# Patient Record
Sex: FEMALE | Race: BLACK OR AFRICAN AMERICAN | NOT HISPANIC OR LATINO | Employment: FULL TIME | URBAN - METROPOLITAN AREA
[De-identification: names, ages, dates, MRNs, and addresses within clinical notes are randomized per-mention and may not be internally consistent; named-entity substitution may affect disease eponyms.]

---

## 2024-11-15 ENCOUNTER — APPOINTMENT (OUTPATIENT)
Dept: RADIOLOGY | Facility: HOSPITAL | Age: 34
End: 2024-11-15
Payer: COMMERCIAL

## 2024-11-15 ENCOUNTER — APPOINTMENT (OUTPATIENT)
Dept: CARDIOLOGY | Facility: HOSPITAL | Age: 34
End: 2024-11-15
Payer: COMMERCIAL

## 2024-11-15 ENCOUNTER — HOSPITAL ENCOUNTER (EMERGENCY)
Facility: HOSPITAL | Age: 34
Discharge: HOME | End: 2024-11-16
Attending: EMERGENCY MEDICINE
Payer: COMMERCIAL

## 2024-11-15 VITALS
BODY MASS INDEX: 32.49 KG/M2 | HEIGHT: 65 IN | WEIGHT: 195 LBS | DIASTOLIC BLOOD PRESSURE: 80 MMHG | HEART RATE: 76 BPM | RESPIRATION RATE: 17 BRPM | SYSTOLIC BLOOD PRESSURE: 142 MMHG | TEMPERATURE: 98.4 F | OXYGEN SATURATION: 100 %

## 2024-11-15 DIAGNOSIS — R55 NEAR SYNCOPE: Primary | ICD-10-CM

## 2024-11-15 LAB
ALBUMIN SERPL BCP-MCNC: 3.9 G/DL (ref 3.4–5)
ALP SERPL-CCNC: 57 U/L (ref 33–110)
ALT SERPL W P-5'-P-CCNC: 22 U/L (ref 7–45)
ANION GAP SERPL CALC-SCNC: 11 MMOL/L (ref 10–20)
AST SERPL W P-5'-P-CCNC: 41 U/L (ref 9–39)
BASOPHILS # BLD AUTO: 0.06 X10*3/UL (ref 0–0.1)
BASOPHILS NFR BLD AUTO: 1 %
BILIRUB SERPL-MCNC: 0.3 MG/DL (ref 0–1.2)
BUN SERPL-MCNC: 12 MG/DL (ref 6–23)
CALCIUM SERPL-MCNC: 9.3 MG/DL (ref 8.6–10.3)
CARDIAC TROPONIN I PNL SERPL HS: 10 NG/L (ref 0–13)
CHLORIDE SERPL-SCNC: 108 MMOL/L (ref 98–107)
CO2 SERPL-SCNC: 24 MMOL/L (ref 21–32)
CREAT SERPL-MCNC: 0.85 MG/DL (ref 0.5–1.05)
EGFRCR SERPLBLD CKD-EPI 2021: >90 ML/MIN/1.73M*2
EOSINOPHIL # BLD AUTO: 0.2 X10*3/UL (ref 0–0.7)
EOSINOPHIL NFR BLD AUTO: 3.2 %
ERYTHROCYTE [DISTWIDTH] IN BLOOD BY AUTOMATED COUNT: 15.2 % (ref 11.5–14.5)
GLUCOSE SERPL-MCNC: 70 MG/DL (ref 74–99)
HCG UR QL IA.RAPID: NEGATIVE
HCT VFR BLD AUTO: 36.8 % (ref 36–46)
HGB BLD-MCNC: 12 G/DL (ref 12–16)
IMM GRANULOCYTES # BLD AUTO: 0.01 X10*3/UL (ref 0–0.7)
IMM GRANULOCYTES NFR BLD AUTO: 0.2 % (ref 0–0.9)
LYMPHOCYTES # BLD AUTO: 2.46 X10*3/UL (ref 1.2–4.8)
LYMPHOCYTES NFR BLD AUTO: 39.5 %
MCH RBC QN AUTO: 27.3 PG (ref 26–34)
MCHC RBC AUTO-ENTMCNC: 32.6 G/DL (ref 32–36)
MCV RBC AUTO: 84 FL (ref 80–100)
MONOCYTES # BLD AUTO: 0.49 X10*3/UL (ref 0.1–1)
MONOCYTES NFR BLD AUTO: 7.9 %
NEUTROPHILS # BLD AUTO: 3.01 X10*3/UL (ref 1.2–7.7)
NEUTROPHILS NFR BLD AUTO: 48.2 %
NRBC BLD-RTO: 0 /100 WBCS (ref 0–0)
PLATELET # BLD AUTO: 283 X10*3/UL (ref 150–450)
POTASSIUM SERPL-SCNC: 4.2 MMOL/L (ref 3.5–5.3)
PROT SERPL-MCNC: 6.7 G/DL (ref 6.4–8.2)
RBC # BLD AUTO: 4.39 X10*6/UL (ref 4–5.2)
SODIUM SERPL-SCNC: 139 MMOL/L (ref 136–145)
WBC # BLD AUTO: 6.2 X10*3/UL (ref 4.4–11.3)

## 2024-11-15 PROCEDURE — 81025 URINE PREGNANCY TEST: CPT | Performed by: NURSE PRACTITIONER

## 2024-11-15 PROCEDURE — 36415 COLL VENOUS BLD VENIPUNCTURE: CPT | Performed by: NURSE PRACTITIONER

## 2024-11-15 PROCEDURE — 99285 EMERGENCY DEPT VISIT HI MDM: CPT | Mod: 25

## 2024-11-15 PROCEDURE — 71046 X-RAY EXAM CHEST 2 VIEWS: CPT | Mod: FOREIGN READ | Performed by: RADIOLOGY

## 2024-11-15 PROCEDURE — 72125 CT NECK SPINE W/O DYE: CPT | Performed by: RADIOLOGY

## 2024-11-15 PROCEDURE — 70450 CT HEAD/BRAIN W/O DYE: CPT | Performed by: RADIOLOGY

## 2024-11-15 PROCEDURE — 80053 COMPREHEN METABOLIC PANEL: CPT | Performed by: NURSE PRACTITIONER

## 2024-11-15 PROCEDURE — 72125 CT NECK SPINE W/O DYE: CPT

## 2024-11-15 PROCEDURE — 93005 ELECTROCARDIOGRAM TRACING: CPT

## 2024-11-15 PROCEDURE — 70450 CT HEAD/BRAIN W/O DYE: CPT

## 2024-11-15 PROCEDURE — 85025 COMPLETE CBC W/AUTO DIFF WBC: CPT | Performed by: NURSE PRACTITIONER

## 2024-11-15 PROCEDURE — 84484 ASSAY OF TROPONIN QUANT: CPT | Performed by: NURSE PRACTITIONER

## 2024-11-15 PROCEDURE — 71046 X-RAY EXAM CHEST 2 VIEWS: CPT

## 2024-11-15 ASSESSMENT — PAIN SCALES - GENERAL: PAINLEVEL_OUTOF10: 6

## 2024-11-15 ASSESSMENT — COLUMBIA-SUICIDE SEVERITY RATING SCALE - C-SSRS
6. HAVE YOU EVER DONE ANYTHING, STARTED TO DO ANYTHING, OR PREPARED TO DO ANYTHING TO END YOUR LIFE?: NO
2. HAVE YOU ACTUALLY HAD ANY THOUGHTS OF KILLING YOURSELF?: NO
1. IN THE PAST MONTH, HAVE YOU WISHED YOU WERE DEAD OR WISHED YOU COULD GO TO SLEEP AND NOT WAKE UP?: NO

## 2024-11-15 ASSESSMENT — PAIN DESCRIPTION - PAIN TYPE: TYPE: ACUTE PAIN

## 2024-11-15 ASSESSMENT — PAIN DESCRIPTION - LOCATION: LOCATION: HEAD

## 2024-11-15 ASSESSMENT — PAIN - FUNCTIONAL ASSESSMENT: PAIN_FUNCTIONAL_ASSESSMENT: 0-10

## 2024-11-16 NOTE — ED PROVIDER NOTES
Chief Complaint   Patient presents with   • Fall   • Head Injury   • Syncope       HPI       34 year old female presents to the Emergency Department today complaining of having a syncopal episode this evening. Reports that she was yelling at her kids to go to bed when she passed out and hit the right side of her head on the ground. Denies any associated fever, chills, headache, neck pain, chest pain, shortness of breath, abdominal pain, nausea, vomiting, diarrhea, constipation, or urinary symptoms. No prior history of syncope. Did not feel that her heart was beating fast, slow, or irregular. No recent periods of immobilization, long travel, recent surgeries, history of cancer, lower extremity edema/pain, or prior history of DVT/PE.       History provided by:  Patient             Patient History   History reviewed. No pertinent past medical history.  History reviewed. No pertinent surgical history.  No family history on file.  Social History     Tobacco Use   • Smoking status: Not on file   • Smokeless tobacco: Not on file   Substance Use Topics   • Alcohol use: Not on file   • Drug use: Not on file           Physical Exam  Constitutional:       Appearance: Normal appearance.   HENT:      Head: Normocephalic.      Right Ear: Tympanic membrane, ear canal and external ear normal.      Left Ear: Tympanic membrane, ear canal and external ear normal.      Ears:      Comments: Bilateral auditory canals are non-inflamed and non-reddened. Bilateral TMs are pearly gray with good light reflex. No hemotympanum or hernández signs noted.      Nose: Nose normal.      Comments: Septum midline without deviation. Turbinates are not inflamed and reddened. No septal hematoma noted.      Mouth/Throat:      Comments:  Mucous membranes are moist. All teeth are intact. Uvula midline without deviation rises upon phonation. Tonsils 1+ without exudate.   Eyes:      Comments: Bilateral conjunctiva are without injection, discharge, or drainage.  PERRL with consensual pupil response bilaterally. EOM's are intact without any signs of entrapment. No hyphema or raccoon's eyes.   Cardiovascular:      Rate and Rhythm: Normal rate and regular rhythm.      Pulses:           Radial pulses are 3+ on the right side and 3+ on the left side.      Heart sounds: Normal heart sounds. No murmur heard.     No friction rub. No gallop.   Pulmonary:      Effort: Pulmonary effort is normal.      Breath sounds: Normal breath sounds. No wheezing, rhonchi or rales.   Abdominal:      General: Abdomen is flat. Bowel sounds are normal.      Palpations: Abdomen is soft.      Tenderness: There is no right CVA tenderness, left CVA tenderness, guarding or rebound. Negative signs include Browning's sign and McBurney's sign.   Musculoskeletal:      Cervical back: Full passive range of motion without pain, normal range of motion and neck supple.      Comments: No edema, cyanosis, or clubbing noted.   Lymphadenopathy:      Cervical: No cervical adenopathy.   Skin:     Comments: No rashes, lesions, petechiae, or purpura. No signs of infection.   Neurological:      Mental Status: She is alert and oriented to person, place, and time.      Comments: Alert and oriented to person, place, or time. Follows commands well. Hand grasps and push/pulls of upper and lower extremities are equally strong bilaterally. Gait is steady and strong.    Psychiatric:         Attention and Perception: Attention normal.         Mood and Affect: Mood normal.         Speech: Speech normal.         Labs Reviewed   CBC WITH AUTO DIFFERENTIAL - Abnormal       Result Value    WBC 6.2      nRBC 0.0      RBC 4.39      Hemoglobin 12.0      Hematocrit 36.8      MCV 84      MCH 27.3      MCHC 32.6      RDW 15.2 (*)     Platelets 283      Neutrophils % 48.2      Immature Granulocytes %, Automated 0.2      Lymphocytes % 39.5      Monocytes % 7.9      Eosinophils % 3.2      Basophils % 1.0      Neutrophils Absolute 3.01      Immature  Granulocytes Absolute, Automated 0.01      Lymphocytes Absolute 2.46      Monocytes Absolute 0.49      Eosinophils Absolute 0.20      Basophils Absolute 0.06     COMPREHENSIVE METABOLIC PANEL - Abnormal    Glucose 70 (*)     Sodium 139      Potassium 4.2      Chloride 108 (*)     Bicarbonate 24      Anion Gap 11      Urea Nitrogen 12      Creatinine 0.85      eGFR >90      Calcium 9.3      Albumin 3.9      Alkaline Phosphatase 57      Total Protein 6.7      AST 41 (*)     Bilirubin, Total 0.3      ALT 22     TROPONIN I, HIGH SENSITIVITY - Normal    Troponin I, High Sensitivity 10      Narrative:     Less than 99th percentile of normal range cutoff-  Female and children under 18 years old <14 ng/L; Male <21 ng/L: Negative  Repeat testing should be performed if clinically indicated.     Female and children under 18 years old 14-50 ng/L; Male 21-50 ng/L:  Consistent with possible cardiac damage and possible increased clinical   risk. Serial measurements may help to assess extent of myocardial damage.     >50 ng/L: Consistent with cardiac damage, increased clinical risk and  myocardial infarction. Serial measurements may help assess extent of   myocardial damage.      NOTE: Children less than 1 year old may have higher baseline troponin   levels and results should be interpreted in conjunction with the overall   clinical context.     NOTE: Troponin I testing is performed using a different   testing methodology at [facility] than at other   [facility]. Direct result comparisons should only   be made within the same method.   HCG, URINE, QUALITATIVE - Normal    HCG, Urine NEGATIVE         XR chest 2 views   Final Result   No acute cardiopulmonary disease.   Signed by Mekhi Bronson      CT head wo IV contrast   Final Result   No evidence of acute intracranial hemorrhage or depressed calvarial   fracture.                  MACRO:   None        Signed by: Malick Herrera 11/15/2024 11:25 PM   Dictation  workstation:   [id number]      CT cervical spine wo IV contrast   Final Result   No evidence of acute fracture or subluxation of the cervical spine.        Straightening of the normal cervical lordosis which may be secondary   to patient positioning or muscle spasm.        MACRO:   None        Signed by: Malick Herrera 11/15/2024 11:26 PM   Dictation workstation:   [id number]               ED Course & MDM   Diagnoses as of 11/16/24 0101   Near syncope           Medical Decision Making  EKG interpreted by Dr. Guerrero. Indication: syncope. Findings: NSR with a ventricular rate of 62, normal axis, normal intervals, and no acute ischemic or injury pattern. Impression: No acute pathology.       Patient was seen and evaluated by Dr. Guerrero. Saline lock was established with labs drawn and results as above. Blood counts, electrolytes, liver function, and kidney function were unremarkable. EKG showed no acute pathology and she had a normal troponin. Therefore, we do not feel that her syncope was related to a cardiac source. Urine pregnancy was negative. CXR shows no acute pathology. CT scans of her head and cervical spine show no evidence of acute intracranial hemorrhage or depressed calvarial fracture, no evidence of acute fracture or subluxation of the cervical spine, and straightening of the normal cervical lordosis which may be secondary to patient positioning or muscle spasm. We are unclear as to the direct etiology of her syncope. Follow up with their doctor in 3 days. Return if worse in any way. Discharged in stable condition with computer instructions.    Diagnostic Impression:     1. Acute syncope               Your medication list      You have not been prescribed any medications.           Procedure  Procedures     Anson Saunders, MODESTO-CNP  11/16/24 0101

## 2024-11-16 NOTE — ED TRIAGE NOTES
Pt presents to the ED for a fall at home. Pt States she stood up, fainted and then hit her head. Pt states this has never happened before. PT states she remembers telling family to got to bed and then woke up on the floor. PT is a and o x4 to baseline. Just complaining of head/eye pain. No thinners.

## 2024-11-18 LAB
ATRIAL RATE: 84 BPM
P AXIS: 66 DEGREES
PR INTERVAL: 152 MS
Q ONSET: 251 MS
QRS COUNT: 13 BEATS
QRS DURATION: 83 MS
QT INTERVAL: 375 MS
QTC CALCULATION(BAZETT): 438 MS
QTC FREDERICIA: 416 MS
R AXIS: 57 DEGREES
T AXIS: 49 DEGREES
T OFFSET: 439 MS
VENTRICULAR RATE: 82 BPM

## 2024-12-15 ENCOUNTER — HOSPITAL ENCOUNTER (EMERGENCY)
Facility: HOSPITAL | Age: 34
Discharge: HOME | End: 2024-12-15
Attending: EMERGENCY MEDICINE
Payer: COMMERCIAL

## 2024-12-15 ENCOUNTER — APPOINTMENT (OUTPATIENT)
Dept: RADIOLOGY | Facility: HOSPITAL | Age: 34
End: 2024-12-15
Payer: COMMERCIAL

## 2024-12-15 ENCOUNTER — PHARMACY VISIT (OUTPATIENT)
Dept: PHARMACY | Facility: CLINIC | Age: 34
End: 2024-12-15
Payer: MEDICAID

## 2024-12-15 VITALS
HEIGHT: 65 IN | TEMPERATURE: 98.4 F | SYSTOLIC BLOOD PRESSURE: 155 MMHG | WEIGHT: 195 LBS | RESPIRATION RATE: 18 BRPM | DIASTOLIC BLOOD PRESSURE: 101 MMHG | BODY MASS INDEX: 32.49 KG/M2 | OXYGEN SATURATION: 100 % | HEART RATE: 83 BPM

## 2024-12-15 DIAGNOSIS — S29.012A UPPER BACK STRAIN, INITIAL ENCOUNTER: Primary | ICD-10-CM

## 2024-12-15 LAB — HCG UR QL IA.RAPID: NEGATIVE

## 2024-12-15 PROCEDURE — 81025 URINE PREGNANCY TEST: CPT | Performed by: EMERGENCY MEDICINE

## 2024-12-15 PROCEDURE — 72072 X-RAY EXAM THORAC SPINE 3VWS: CPT | Performed by: RADIOLOGY

## 2024-12-15 PROCEDURE — 71045 X-RAY EXAM CHEST 1 VIEW: CPT

## 2024-12-15 PROCEDURE — 99284 EMERGENCY DEPT VISIT MOD MDM: CPT | Performed by: EMERGENCY MEDICINE

## 2024-12-15 PROCEDURE — 72072 X-RAY EXAM THORAC SPINE 3VWS: CPT

## 2024-12-15 PROCEDURE — 71045 X-RAY EXAM CHEST 1 VIEW: CPT | Performed by: RADIOLOGY

## 2024-12-15 PROCEDURE — RXMED WILLOW AMBULATORY MEDICATION CHARGE

## 2024-12-15 RX ORDER — METAXALONE 800 MG/1
800 TABLET ORAL 3 TIMES DAILY
Qty: 30 TABLET | Refills: 0 | Status: SHIPPED | OUTPATIENT
Start: 2024-12-15 | End: 2024-12-25

## 2024-12-15 RX ORDER — METHYLPREDNISOLONE 4 MG/1
TABLET ORAL
Qty: 21 TABLET | Refills: 0 | Status: SHIPPED | OUTPATIENT
Start: 2024-12-15 | End: 2024-12-21

## 2024-12-15 RX ORDER — NAPROXEN 500 MG/1
500 TABLET ORAL
Qty: 30 TABLET | Refills: 0 | Status: SHIPPED | OUTPATIENT
Start: 2024-12-15 | End: 2024-12-30

## 2024-12-15 ASSESSMENT — PAIN - FUNCTIONAL ASSESSMENT: PAIN_FUNCTIONAL_ASSESSMENT: 0-10

## 2024-12-15 ASSESSMENT — COLUMBIA-SUICIDE SEVERITY RATING SCALE - C-SSRS
1. IN THE PAST MONTH, HAVE YOU WISHED YOU WERE DEAD OR WISHED YOU COULD GO TO SLEEP AND NOT WAKE UP?: NO
6. HAVE YOU EVER DONE ANYTHING, STARTED TO DO ANYTHING, OR PREPARED TO DO ANYTHING TO END YOUR LIFE?: NO
2. HAVE YOU ACTUALLY HAD ANY THOUGHTS OF KILLING YOURSELF?: NO

## 2024-12-15 ASSESSMENT — PAIN SCALES - GENERAL: PAINLEVEL_OUTOF10: 10 - WORST POSSIBLE PAIN

## 2024-12-15 NOTE — ED PROVIDER NOTES
HPI   Chief Complaint   Patient presents with    Back Pain     PT passed out and fell about a month ago and was seen here for it. PT states workup was done to determine the cause of the passing out but nothing was done about her back. PT has been having difficulty sitting up straight due to pain, hurts to cough as well. With backpain that radiates to the front       Pt is a 33 yo female presenting with mid thoracic back pain for the past 48 hours with a pmhx of syncope. The syncope is considered the inciting event of the back pain a few weeks ago that is much worse today (10/10). She was brought to the ED by her partner who is in the room and partial historian. She does not remember how she fell a few weeks ago and does not have any inciting events for this flare up.  Pt denies any numbness, tingling, incontinence, constipation or weakness. Pt reports pain between t3-t5. No recent illness no NVD. Pt is currently kneeling on the bed leaning over the back for support. Pt has tried Ibuprofen and Tylenol for pain relief, does take the edge off. Currently wearing posture device for relief.           History provided by:  Spouse   used: No            Patient History   History reviewed. No pertinent past medical history.  History reviewed. No pertinent surgical history.  No family history on file.  Social History     Tobacco Use    Smoking status: Not on file    Smokeless tobacco: Not on file   Substance Use Topics    Alcohol use: Not on file    Drug use: Not on file       Physical Exam   ED Triage Vitals [12/15/24 1227]   Temperature Heart Rate Respirations BP   36.9 °C (98.4 °F) 83 18 (!) 155/101      Pulse Ox Temp src Heart Rate Source Patient Position   100 % -- -- --      BP Location FiO2 (%)     -- --       Physical Exam  Constitutional:       Appearance: She is not ill-appearing, toxic-appearing or diaphoretic.   HENT:      Head: Normocephalic and atraumatic.      Mouth/Throat:      Mouth: Mucous  membranes are moist.      Pharynx: Oropharynx is clear.   Eyes:      Pupils: Pupils are equal, round, and reactive to light.   Cardiovascular:      Rate and Rhythm: Regular rhythm. Tachycardia present.      Heart sounds: No murmur heard.     No friction rub. No gallop.   Pulmonary:      Effort: Pulmonary effort is normal. No respiratory distress.      Breath sounds: Normal breath sounds. No stridor. No wheezing or rales.   Abdominal:      General: Abdomen is flat. Bowel sounds are normal.      Palpations: Abdomen is soft. There is no mass.      Tenderness: There is no abdominal tenderness. There is no guarding.   Musculoskeletal:      Cervical back: Normal and normal range of motion.      Thoracic back: Tenderness present. Decreased range of motion.      Lumbar back: Normal.      Comments: Paraspinal muscled TTP and partially relieved by message    Skin:     General: Skin is warm and dry.      Capillary Refill: Capillary refill takes 2 to 3 seconds.   Neurological:      Mental Status: She is alert.   Psychiatric:         Mood and Affect: Mood normal.         Thought Content: Thought content normal.         Judgment: Judgment normal.           ED Course & MDM   Diagnoses as of 12/20/24 2313   Upper back strain, initial encounter                 No data recorded     Esteban Coma Scale Score: 15 (12/15/24 1227 : Terrence Hernadez, CORTEZ)                           Medical Decision Making      Procedure  Procedures     Kevin Lawrence  12/20/24 3587

## 2024-12-16 NOTE — ED PROVIDER NOTES
Emergency Department Provider Note        MEDICAL DECISION MAKING:  Medical Decision Making       12/15/24     Chief Complaint   Patient presents with    Back Pain     PT passed out and fell about a month ago and was seen here for it. PT states workup was done to determine the cause of the passing out but nothing was done about her back. PT has been having difficulty sitting up straight due to pain, hurts to cough as well. With backpain that radiates to the front       HPI: Krupa Salazar  is a 34 y.o. presents with Pt is a 35 yo female presenting with mid thoracic back pain for the past 48 hours with a pmhx of syncope. The syncope is considered the inciting event of the back pain a few weeks ago that is much worse today (10/10). She was brought to the ED by her partner who is in the room and partial historian. She does not remember how she fell a few weeks ago and does not have any inciting events for this flare up. Pt denies any numbness, tingling, incontinence, constipation or weakness. Pt reports pain between t3-t5. No recent illness no NVD. Pt is currently kneeling on the bed leaning over the back for support. Pt has tried Ibuprofen and Tylenol for pain relief, does take the edge off. Currently wearing posture device for relief.   Past medical records, Past medical history and surgical history reviewed and as documented.  History reviewed and as noted. past medical records reviewed.    Active Ambulatory Problems     Diagnosis Date Noted    No Active Ambulatory Problems     Resolved Ambulatory Problems     Diagnosis Date Noted    No Resolved Ambulatory Problems     No Additional Past Medical History        History reviewed. No pertinent surgical history.     No family history on file.           No Known Allergies     Unless otherwise stated in this report the patient's positive and negative responses for review of systems for constitutional, eyes, ENT, cardiovascular, respiratory, gastrointestinal,  "neurological, genitourinary, musculoskeletal, and integument systems and related systems to the presenting problem are either as stated in the HPI or were not pertinent or were negative for the symptoms and/or complaints related to the presenting medical problem.    PHYSICAL EXAM  Triage/nursing notes and vital signs reviewed as available and as noted    Vitals:    12/15/24 1227   BP: (!) 155/101   Pulse: 83   Resp: 18   Temp: 36.9 °C (98.4 °F)   SpO2: 100%   Weight: 88.5 kg (195 lb)   Height: 1.651 m (5' 5\")           Constitutional:       Appearance: Patient not ill-appearing or toxic-appearing.   HENT:      Head: Atraumatic.      Mouth/Throat:      Mouth: Mucous membranes are moist.      Pharynx: Oropharynx is clear. No pharyngeal swelling.      Neck: No Obvious JVD. Trachea midline. No neck swelling.  Eyes:      Normal Ocular tracking  Cardiovascular:      Rate and Rhythm: Normal rate and regular rhythm.      Pulses: Normal pulses.      Heart sounds: No murmur heard.  Pulmonary:      Effort: Pulmonary effort is normal.      Breath sounds: Normal breath sounds.   Abdominal:      General: Bowel sounds are normal.      Palpations: Abdomen is soft.      Tenderness: There is no abdominal tenderness. There is no guarding or rebound.   Musculoskeletal:      Cervical back: Neck supple.      Right lower leg: No tenderness. No edema.      Left lower leg: No tenderness. No edema.   There is no focal tenderness to palpation throughout the lumbar spine especially of the bones. There is paraspinal musculature tenderness noted with some spasming of the soft tissues. Negative straight leg raise. No step-off or crepitance appreciated. Normal motor sensory, symmetric reflexes, strong equal peripheral pulses, and normal Babinski's bilaterally.  I was present in the Emergency Department and personally available for consult with the advanced practice provider as deemed warranted by the advanced practice provider.  Skin:     General: " Skin is warm and dry.      Capillary Refill: Capillary refill takes less than 2 seconds.   Neurological:      General: No focal deficit present.      Mental Status: Patient is alert.  Appropriate conversant     Sensory: Gross Sensation is intact.      Motor: Gross Motor function is intact symmetrically  Psychiatric:         Mood and Affect: Mood normal.      Cooperative, no apparent risk to self or others    ED COURSE        Work-up performed to evaluate for differential diagnosis as clinically indicated   Orders Placed This Encounter   Procedures    XR thoracic spine 3 views    XR chest 1 view    hCG, Urine, Qualitative    Referral to Primary Care          Labs and imaging reviewed by me  and note         Labs Reviewed   HCG, URINE, QUALITATIVE - Normal       Result Value    HCG, Urine NEGATIVE          XR thoracic spine 3 views   Final Result   No thoracic vertebral displacement. Mild endplate spurring in the   lower thoracic spine.        MACRO:   None.        Signed by: Madelaine Kapadia 12/15/2024 3:03 PM   Dictation workstation:   [id number]      XR chest 1 view   Final Result   No acute cardiopulmonary process radiographically.        MACRO:   None.        Signed by: Madelaine Kapadia 12/15/2024 3:02 PM   Dictation workstation:   [id number]               Pt course which Intervention and treatment included :    Procedure  Procedures    Medications - No data to display     Diagnoses as of 12/15/24 2124   Upper back strain, initial encounter          DISPOSITION: Patient is stable for discharge.  Based upon my history, physical exam, evaluation and judgement regarding the aforementioned differentials, at the time of this assessment, I do not see evidence to support the presence of any life, neurologic, or limb threatening pathology in my considered differentials. Alternate non life threatening pathology has been considered, and has been ruled out based upon the findings of my evaluation. While there may be remaining  pathology considered within the differential, this is not life threatening, not limb threatening, and does not warrant further emergent evaluation or treatment at this time.  Shared decision making made with the patient as applicable.  It is my judgement that further treatment and evaluation can safely proceed in the outpatient setting. Shared decision making was utilized to arrive at all clinical decisions. At this time I do not see evidence of an emergency medical condition based upon my medical screening examination.  All imaging and laboratory results were discussed with the patient in detail including acute as well as incidental findings.  I discussed the differential, results and discharge plan with the patient and/or family/friend/caregiver if present. Education and reassurance provided regards to presumed diagnosis. I emphasized the importance of follow-up with the physician I referred them to in the timeframe recommended. I explained reasons for the patient to return to the Emergency Department. Additional verbal discharge instructions were also given and discussed with the patient to supplement those generated by the EMR. We also discussed medications that were prescribed (if any) including common side effects and interactions as well as proper dosing and administration. The patient was advised to abstain from driving, operating heavy machinery or making significant decisions while taking medications such as opiates and muscle relaxers that may impair this. All questions were addressed. They understand return precautions and discharge instructions. The patient and/or family/friend/caregiver expressed understanding    1. Upper back strain, initial encounter  Referral to Primary Care    metaxalone (Skelaxin) 800 mg tablet    naproxen (Naprosyn) 500 mg tablet    methylPREDNISolone (Medrol Dospak) 4 mg tablets         -------------------------------------------------------------------    12/15/24 at 9:24 PM -  Marty R LeJeune, DO   Internal & Emergency Medicine          Marty R Lejeune, DO  Resident  12/15/24 0444

## 2025-01-08 DIAGNOSIS — S29.012A UPPER BACK STRAIN, INITIAL ENCOUNTER: ICD-10-CM

## 2025-01-08 RX ORDER — METAXALONE 800 MG/1
800 TABLET ORAL 3 TIMES DAILY
Qty: 30 TABLET | Refills: 0 | Status: CANCELLED | OUTPATIENT
Start: 2025-01-08 | End: 2025-01-18